# Patient Record
Sex: MALE | URBAN - METROPOLITAN AREA
[De-identification: names, ages, dates, MRNs, and addresses within clinical notes are randomized per-mention and may not be internally consistent; named-entity substitution may affect disease eponyms.]

---

## 2021-11-24 ENCOUNTER — APPOINTMENT (RX ONLY)
Dept: URBAN - METROPOLITAN AREA CLINIC 87 | Facility: CLINIC | Age: 31
Setting detail: DERMATOLOGY
End: 2021-11-24

## 2021-11-24 VITALS — WEIGHT: 165 LBS | HEIGHT: 70 IN | TEMPERATURE: 97.3 F

## 2021-11-24 DIAGNOSIS — L72.3 SEBACEOUS CYST: ICD-10-CM

## 2021-11-24 PROCEDURE — ? PRESCRIPTION

## 2021-11-24 PROCEDURE — ? CPT CODE GENERATOR

## 2021-11-24 PROCEDURE — ? DEFER

## 2021-11-24 PROCEDURE — ? COUNSELING

## 2021-11-24 PROCEDURE — 99203 OFFICE O/P NEW LOW 30 MIN: CPT

## 2021-11-24 RX ORDER — CEPHALEXIN 500 MG/1
TABLET ORAL
Qty: 30 | Refills: 0 | Status: ERX | COMMUNITY
Start: 2021-11-24

## 2021-11-24 RX ADMIN — CEPHALEXIN: 500 TABLET ORAL at 00:00

## 2021-11-24 ASSESSMENT — LOCATION SIMPLE DESCRIPTION DERM: LOCATION SIMPLE: RIGHT LOWER BACK

## 2021-11-24 ASSESSMENT — LOCATION DETAILED DESCRIPTION DERM: LOCATION DETAILED: RIGHT SUPERIOR LATERAL MIDBACK

## 2021-11-24 ASSESSMENT — LOCATION ZONE DERM: LOCATION ZONE: TRUNK

## 2021-11-24 NOTE — PROCEDURE: CPT CODE GENERATOR
16471 Price: 0.00
Fee Schedule Discount For Cash Pay Patients In % Off Of Fee Schedule: 0
How Many Lesions Are You Destroying?: Less than 15
Repair: Complex Primary Closure
Number Of Lesions Injected: Less than 8 lesions
Show Biopsy Codes?: No
Anticipated Stages (Required): 1
Which Photosensitizer Was Used?: Levulan
Location (Required): Trunk
Anticipated Depth And Size Of Soft Tissue Excision (Required): Subcutaneous, Less than 2 cm
Anticipated Depth And Size Of Soft Tissue Excision (Required): Subcutaneous, Less than 3 cm
Anticipated Depth And Size Of Soft Tissue Excision (Required): Subcutaneous, Less than 1.5 cm
Fee Schedule Discount In % Off Of Fee Schedule: Standard Fee Schedule as Entered in Pricing Tab
Detail Level: None
Anticipated Excised Diameter (Required): Greater Than 4.0 cm
Anticipated Wound Length (Required): 2.6-7.5 cm
First Procedure You Would Like A Quote For?: Benign Excision
First Biopsy Type: Tangential Biopsy